# Patient Record
Sex: MALE | URBAN - NONMETROPOLITAN AREA
[De-identification: names, ages, dates, MRNs, and addresses within clinical notes are randomized per-mention and may not be internally consistent; named-entity substitution may affect disease eponyms.]

---

## 2022-06-28 ENCOUNTER — DOCTOR'S OFFICE (OUTPATIENT)
Dept: URBAN - NONMETROPOLITAN AREA CLINIC 1 | Facility: CLINIC | Age: 82
Setting detail: OPHTHALMOLOGY
End: 2022-06-28
Payer: COMMERCIAL

## 2022-06-28 DIAGNOSIS — H35.363: ICD-10-CM

## 2022-06-28 PROCEDURE — 99214 OFFICE O/P EST MOD 30 MIN: CPT | Performed by: OPHTHALMOLOGY

## 2022-06-28 PROCEDURE — 92134 CPTRZ OPH DX IMG PST SGM RTA: CPT | Performed by: OPHTHALMOLOGY

## 2022-06-28 ASSESSMENT — LID POSITION - DERMATOCHALASIS
OD_DERMATOCHALASIS: T
OS_DERMATOCHALASIS: T

## 2022-06-28 ASSESSMENT — LID POSITION - PTOSIS
OD_PTOSIS: T
OS_PTOSIS: T

## 2022-06-28 ASSESSMENT — CONFRONTATIONAL VISUAL FIELD TEST (CVF)
OS_FINDINGS: FULL
OD_FINDINGS: FULL

## 2022-06-30 ASSESSMENT — KERATOMETRY
OS_AXISANGLE_DEGREES: 94
OS_K1POWER_DIOPTERS: 46.25
OD_K1POWER_DIOPTERS: 44.25
OS_K2POWER_DIOPTERS: 47.25
OD_AXISANGLE_DEGREES: 103
OD_K2POWER_DIOPTERS: 44.75

## 2022-06-30 ASSESSMENT — VISUAL ACUITY
OS_BCVA: 20/200
OD_BCVA: 20/200+1

## 2022-08-11 ENCOUNTER — DOCTOR'S OFFICE (OUTPATIENT)
Dept: URBAN - NONMETROPOLITAN AREA CLINIC 1 | Facility: CLINIC | Age: 82
Setting detail: OPHTHALMOLOGY
End: 2022-08-11
Payer: COMMERCIAL

## 2022-08-11 DIAGNOSIS — H25.12: ICD-10-CM

## 2022-08-11 PROCEDURE — 92136 OPHTHALMIC BIOMETRY: CPT | Performed by: OPHTHALMOLOGY

## 2022-08-16 ENCOUNTER — DOCTOR'S OFFICE (OUTPATIENT)
Dept: URBAN - NONMETROPOLITAN AREA CLINIC 1 | Facility: CLINIC | Age: 82
Setting detail: OPHTHALMOLOGY
End: 2022-08-16
Payer: COMMERCIAL

## 2022-08-16 DIAGNOSIS — H25.13: ICD-10-CM

## 2022-08-16 DIAGNOSIS — H25.043: ICD-10-CM

## 2022-08-16 DIAGNOSIS — H25.013: ICD-10-CM

## 2022-08-16 PROCEDURE — 99213 OFFICE O/P EST LOW 20 MIN: CPT | Performed by: OPHTHALMOLOGY

## 2022-08-16 ASSESSMENT — LID POSITION - DERMATOCHALASIS
OS_DERMATOCHALASIS: T
OD_DERMATOCHALASIS: T

## 2022-08-16 ASSESSMENT — KERATOMETRY
OD_K1POWER_DIOPTERS: 44.25
OS_K1POWER_DIOPTERS: 44.50
OD_K2POWER_DIOPTERS: 44.25
OS_AXISANGLE_DEGREES: 086
OS_K2POWER_DIOPTERS: 45.00

## 2022-08-16 ASSESSMENT — LID POSITION - PTOSIS
OS_PTOSIS: T
OD_PTOSIS: T

## 2022-08-16 ASSESSMENT — CONFRONTATIONAL VISUAL FIELD TEST (CVF)
OS_FINDINGS: FULL
OD_FINDINGS: FULL

## 2022-08-16 ASSESSMENT — VISUAL ACUITY
OD_BCVA: 20/150+1
OS_BCVA: 20/200

## 2022-08-22 ENCOUNTER — AMBUL SURGICAL CARE (OUTPATIENT)
Dept: URBAN - NONMETROPOLITAN AREA SURGERY 1 | Facility: SURGERY | Age: 82
Setting detail: OPHTHALMOLOGY
End: 2022-08-22
Payer: COMMERCIAL

## 2022-08-22 DIAGNOSIS — H25.012: ICD-10-CM

## 2022-08-22 DIAGNOSIS — H25.042: ICD-10-CM

## 2022-08-22 DIAGNOSIS — H25.12: ICD-10-CM

## 2022-08-22 PROCEDURE — 66982 XCAPSL CTRC RMVL CPLX WO ECP: CPT | Performed by: OPHTHALMOLOGY

## 2022-08-22 PROCEDURE — G8918 PT W/O PREOP ORDER IV AB PRO: HCPCS | Performed by: CLINIC/CENTER

## 2022-08-22 PROCEDURE — G8918 PT W/O PREOP ORDER IV AB PRO: HCPCS | Performed by: OPHTHALMOLOGY

## 2022-08-22 PROCEDURE — G8907 PT DOC NO EVENTS ON DISCHARG: HCPCS | Performed by: OPHTHALMOLOGY

## 2022-08-22 PROCEDURE — 66982 XCAPSL CTRC RMVL CPLX WO ECP: CPT | Performed by: CLINIC/CENTER

## 2022-08-22 PROCEDURE — G8907 PT DOC NO EVENTS ON DISCHARG: HCPCS | Performed by: CLINIC/CENTER

## 2022-08-23 ENCOUNTER — DOCTOR'S OFFICE (OUTPATIENT)
Dept: URBAN - NONMETROPOLITAN AREA CLINIC 1 | Facility: CLINIC | Age: 82
Setting detail: OPHTHALMOLOGY
End: 2022-08-23
Payer: COMMERCIAL

## 2022-08-23 ENCOUNTER — RX ONLY (RX ONLY)
Age: 82
End: 2022-08-23

## 2022-08-23 DIAGNOSIS — H25.043: ICD-10-CM

## 2022-08-23 DIAGNOSIS — H25.013: ICD-10-CM

## 2022-08-23 DIAGNOSIS — H25.13: ICD-10-CM

## 2022-08-23 DIAGNOSIS — H25.041: ICD-10-CM

## 2022-08-23 DIAGNOSIS — H25.011: ICD-10-CM

## 2022-08-23 DIAGNOSIS — H25.11: ICD-10-CM

## 2022-08-23 PROCEDURE — 99024 POSTOP FOLLOW-UP VISIT: CPT | Performed by: OPHTHALMOLOGY

## 2022-08-23 PROCEDURE — 92136 OPHTHALMIC BIOMETRY: CPT | Performed by: OPHTHALMOLOGY

## 2022-08-23 ASSESSMENT — KERATOMETRY
OD_K1POWER_DIOPTERS: 44.25
OD_AXISANGLE_DEGREES: 36
OD_K2POWER_DIOPTERS: 44.754
OS_K2POWER_DIOPTERS: 46.75
OS_AXISANGLE_DEGREES: 65
OS_K1POWER_DIOPTERS: 45.75

## 2022-08-23 ASSESSMENT — VISUAL ACUITY
OS_BCVA: 20/150+1
OD_BCVA: 20/40

## 2022-08-23 ASSESSMENT — LID POSITION - DERMATOCHALASIS
OS_DERMATOCHALASIS: T
OD_DERMATOCHALASIS: T

## 2022-08-23 ASSESSMENT — LID POSITION - PTOSIS
OD_PTOSIS: T
OS_PTOSIS: T

## 2022-08-30 ENCOUNTER — DOCTOR'S OFFICE (OUTPATIENT)
Dept: URBAN - NONMETROPOLITAN AREA CLINIC 1 | Facility: CLINIC | Age: 82
Setting detail: OPHTHALMOLOGY
End: 2022-08-30
Payer: COMMERCIAL

## 2022-08-30 DIAGNOSIS — H25.011: ICD-10-CM

## 2022-08-30 DIAGNOSIS — H25.11: ICD-10-CM

## 2022-08-30 DIAGNOSIS — H25.041: ICD-10-CM

## 2022-08-30 DIAGNOSIS — Z96.1: ICD-10-CM

## 2022-08-30 PROCEDURE — 99024 POSTOP FOLLOW-UP VISIT: CPT | Performed by: OPHTHALMOLOGY

## 2022-08-30 ASSESSMENT — LID POSITION - DERMATOCHALASIS
OS_DERMATOCHALASIS: T
OD_DERMATOCHALASIS: T

## 2022-08-30 ASSESSMENT — SPHEQUIV_DERIVED: OS_SPHEQUIV: 0.125

## 2022-08-30 ASSESSMENT — VISUAL ACUITY
OD_BCVA: 20/20
OS_BCVA: 20/150

## 2022-08-30 ASSESSMENT — KERATOMETRY
OD_K1POWER_DIOPTERS: 44.00
OD_K2POWER_DIOPTERS: 44.50
OS_AXISANGLE_DEGREES: 68
OS_K1POWER_DIOPTERS: 44.50
OS_K2POWER_DIOPTERS: 44.75
OD_AXISANGLE_DEGREES: 73

## 2022-08-30 ASSESSMENT — REFRACTION_AUTOREFRACTION
OS_CYLINDER: -0.25
OS_SPHERE: +0.25
OS_AXIS: 116

## 2022-08-30 ASSESSMENT — AXIALLENGTH_DERIVED: OS_AL: 23.1389

## 2022-08-30 ASSESSMENT — LID POSITION - PTOSIS
OD_PTOSIS: T
OS_PTOSIS: T

## 2022-09-13 ENCOUNTER — AMBUL SURGICAL CARE (OUTPATIENT)
Dept: URBAN - NONMETROPOLITAN AREA SURGERY 1 | Facility: SURGERY | Age: 82
Setting detail: OPHTHALMOLOGY
End: 2022-09-13
Payer: COMMERCIAL

## 2022-09-13 DIAGNOSIS — H25.011: ICD-10-CM

## 2022-09-13 DIAGNOSIS — H25.041: ICD-10-CM

## 2022-09-13 DIAGNOSIS — H25.11: ICD-10-CM

## 2022-09-13 PROCEDURE — 66982 XCAPSL CTRC RMVL CPLX WO ECP: CPT | Performed by: OPHTHALMOLOGY

## 2022-09-13 PROCEDURE — G8918 PT W/O PREOP ORDER IV AB PRO: HCPCS | Performed by: CLINIC/CENTER

## 2022-09-13 PROCEDURE — G8907 PT DOC NO EVENTS ON DISCHARG: HCPCS | Performed by: OPHTHALMOLOGY

## 2022-09-13 PROCEDURE — G8907 PT DOC NO EVENTS ON DISCHARG: HCPCS | Performed by: CLINIC/CENTER

## 2022-09-13 PROCEDURE — 66982 XCAPSL CTRC RMVL CPLX WO ECP: CPT | Performed by: CLINIC/CENTER

## 2022-09-13 PROCEDURE — G8918 PT W/O PREOP ORDER IV AB PRO: HCPCS | Performed by: OPHTHALMOLOGY

## 2022-09-14 ENCOUNTER — RX ONLY (RX ONLY)
Age: 82
End: 2022-09-14

## 2022-09-14 ENCOUNTER — DOCTOR'S OFFICE (OUTPATIENT)
Dept: URBAN - NONMETROPOLITAN AREA CLINIC 1 | Facility: CLINIC | Age: 82
Setting detail: OPHTHALMOLOGY
End: 2022-09-14
Payer: COMMERCIAL

## 2022-09-14 DIAGNOSIS — Z96.1: ICD-10-CM

## 2022-09-14 PROCEDURE — 99024 POSTOP FOLLOW-UP VISIT: CPT | Performed by: OPHTHALMOLOGY

## 2022-09-14 ASSESSMENT — LID POSITION - PTOSIS
OS_PTOSIS: T
OD_PTOSIS: T

## 2022-09-14 ASSESSMENT — REFRACTION_AUTOREFRACTION
OS_CYLINDER: -0.50
OS_SPHERE: +0.75
OS_AXIS: 001

## 2022-09-14 ASSESSMENT — LID POSITION - DERMATOCHALASIS
OS_DERMATOCHALASIS: T
OD_DERMATOCHALASIS: T

## 2022-09-14 ASSESSMENT — KERATOMETRY
OD_K1POWER_DIOPTERS: 43.75
OS_K2POWER_DIOPTERS: 45.00
OS_K1POWER_DIOPTERS: 44.75
OS_AXISANGLE_DEGREES: 079
OD_K2POWER_DIOPTERS: 45.50
OD_AXISANGLE_DEGREES: 039

## 2022-09-14 ASSESSMENT — SPHEQUIV_DERIVED: OS_SPHEQUIV: 0.5

## 2022-09-14 ASSESSMENT — VISUAL ACUITY
OS_BCVA: 20/200-1
OD_BCVA: 20/25-2

## 2022-09-14 ASSESSMENT — AXIALLENGTH_DERIVED: OS_AL: 22.9122

## 2022-09-27 ENCOUNTER — RX ONLY (RX ONLY)
Age: 82
End: 2022-09-27

## 2022-09-27 ENCOUNTER — DOCTOR'S OFFICE (OUTPATIENT)
Dept: URBAN - NONMETROPOLITAN AREA CLINIC 1 | Facility: CLINIC | Age: 82
Setting detail: OPHTHALMOLOGY
End: 2022-09-27
Payer: COMMERCIAL

## 2022-09-27 DIAGNOSIS — Z96.1: ICD-10-CM

## 2022-09-27 PROCEDURE — 99024 POSTOP FOLLOW-UP VISIT: CPT | Performed by: OPHTHALMOLOGY

## 2022-09-27 ASSESSMENT — VISUAL ACUITY
OS_BCVA: 20/20-1
OD_BCVA: 20/20-2

## 2022-09-27 ASSESSMENT — AXIALLENGTH_DERIVED
OS_AL: 23.1777
OD_AL: 23.0894

## 2022-09-27 ASSESSMENT — KERATOMETRY
OD_K1POWER_DIOPTERS: 44.25
OS_K2POWER_DIOPTERS: 44.50
OD_AXISANGLE_DEGREES: 086
OD_K2POWER_DIOPTERS: 44.75
OS_AXISANGLE_DEGREES: 073
OS_K1POWER_DIOPTERS: 44.00

## 2022-09-27 ASSESSMENT — LID POSITION - DERMATOCHALASIS
OS_DERMATOCHALASIS: T
OD_DERMATOCHALASIS: T

## 2022-09-27 ASSESSMENT — SPHEQUIV_DERIVED
OD_SPHEQUIV: 0.375
OS_SPHEQUIV: 0.375

## 2022-09-27 ASSESSMENT — REFRACTION_AUTOREFRACTION
OD_CYLINDER: -0.75
OS_CYLINDER: -0.75
OD_AXIS: 156
OS_SPHERE: +0.75
OS_AXIS: 168
OD_SPHERE: +0.75

## 2022-09-27 ASSESSMENT — LID POSITION - PTOSIS
OS_PTOSIS: T
OD_PTOSIS: T

## 2022-11-15 ENCOUNTER — DOCTOR'S OFFICE (OUTPATIENT)
Dept: URBAN - NONMETROPOLITAN AREA CLINIC 1 | Facility: CLINIC | Age: 82
Setting detail: OPHTHALMOLOGY
End: 2022-11-15
Payer: COMMERCIAL

## 2022-11-15 DIAGNOSIS — H43.811: ICD-10-CM

## 2022-11-15 DIAGNOSIS — H26.40: ICD-10-CM

## 2022-11-15 DIAGNOSIS — Z96.1: ICD-10-CM

## 2022-11-15 DIAGNOSIS — H35.363: ICD-10-CM

## 2022-11-15 PROCEDURE — 99024 POSTOP FOLLOW-UP VISIT: CPT | Performed by: OPHTHALMOLOGY

## 2022-11-15 PROCEDURE — 92134 CPTRZ OPH DX IMG PST SGM RTA: CPT | Performed by: OPHTHALMOLOGY

## 2022-11-15 ASSESSMENT — KERATOMETRY
OS_K2POWER_DIOPTERS: 45.00
OD_AXISANGLE_DEGREES: 079
OD_K2POWER_DIOPTERS: 44.00
OS_K1POWER_DIOPTERS: 44.50
OD_K1POWER_DIOPTERS: 43.50
OS_AXISANGLE_DEGREES: 058

## 2022-11-15 ASSESSMENT — REFRACTION_AUTOREFRACTION
OD_AXIS: 171
OD_CYLINDER: -1.00
OS_SPHERE: ERROR
OD_SPHERE: +0.75

## 2022-11-15 ASSESSMENT — LID POSITION - PTOSIS
OS_PTOSIS: T
OD_PTOSIS: T

## 2022-11-15 ASSESSMENT — CORNEAL DYSTROPHY
OD_DYSTROPHY: + ARCUS
OS_DYSTROPHY: + ARCUS

## 2022-11-15 ASSESSMENT — VISUAL ACUITY
OS_BCVA: 20/20-1
OD_BCVA: 20/20-1

## 2022-11-15 ASSESSMENT — CONFRONTATIONAL VISUAL FIELD TEST (CVF)
OD_FINDINGS: FULL
OS_FINDINGS: FULL

## 2022-11-15 ASSESSMENT — SPHEQUIV_DERIVED: OD_SPHEQUIV: 0.25

## 2022-11-15 ASSESSMENT — LID POSITION - DERMATOCHALASIS
OD_DERMATOCHALASIS: T
OS_DERMATOCHALASIS: T

## 2022-11-15 ASSESSMENT — AXIALLENGTH_DERIVED: OD_AL: 23.4042

## 2022-11-18 ENCOUNTER — AMBUL SURGICAL CARE (OUTPATIENT)
Dept: URBAN - NONMETROPOLITAN AREA SURGERY 1 | Facility: SURGERY | Age: 82
Setting detail: OPHTHALMOLOGY
End: 2022-11-18
Payer: COMMERCIAL

## 2022-11-18 DIAGNOSIS — H26.492: ICD-10-CM

## 2022-11-18 PROCEDURE — G8918 PT W/O PREOP ORDER IV AB PRO: HCPCS | Performed by: OPHTHALMOLOGY

## 2022-11-18 PROCEDURE — 66821 AFTER CATARACT LASER SURGERY: CPT | Performed by: OPHTHALMOLOGY

## 2022-11-18 PROCEDURE — G8918 PT W/O PREOP ORDER IV AB PRO: HCPCS | Performed by: CLINIC/CENTER

## 2022-11-18 PROCEDURE — G8907 PT DOC NO EVENTS ON DISCHARG: HCPCS | Performed by: CLINIC/CENTER

## 2022-11-18 PROCEDURE — 66821 AFTER CATARACT LASER SURGERY: CPT | Performed by: CLINIC/CENTER

## 2022-11-18 PROCEDURE — G8907 PT DOC NO EVENTS ON DISCHARG: HCPCS | Performed by: OPHTHALMOLOGY

## 2022-11-28 ENCOUNTER — AMBUL SURGICAL CARE (OUTPATIENT)
Dept: URBAN - NONMETROPOLITAN AREA SURGERY 1 | Facility: SURGERY | Age: 82
Setting detail: OPHTHALMOLOGY
End: 2022-11-28
Payer: COMMERCIAL

## 2022-11-28 DIAGNOSIS — H26.491: ICD-10-CM

## 2022-11-28 PROBLEM — H26.40 PCO: Status: ACTIVE | Noted: 2022-11-15

## 2022-11-28 PROCEDURE — 66821 AFTER CATARACT LASER SURGERY: CPT | Performed by: CLINIC/CENTER

## 2022-11-28 PROCEDURE — G8907 PT DOC NO EVENTS ON DISCHARG: HCPCS | Performed by: OPHTHALMOLOGY

## 2022-11-28 PROCEDURE — 66821 AFTER CATARACT LASER SURGERY: CPT | Performed by: OPHTHALMOLOGY

## 2022-11-28 PROCEDURE — G8907 PT DOC NO EVENTS ON DISCHARG: HCPCS | Performed by: CLINIC/CENTER

## 2022-11-28 PROCEDURE — G8918 PT W/O PREOP ORDER IV AB PRO: HCPCS | Performed by: CLINIC/CENTER

## 2022-11-28 PROCEDURE — G8918 PT W/O PREOP ORDER IV AB PRO: HCPCS | Performed by: OPHTHALMOLOGY

## 2024-06-27 RX ORDER — OMEPRAZOLE 40 MG/1
40 CAPSULE, DELAYED RELEASE ORAL DAILY
COMMUNITY

## 2024-06-27 RX ORDER — TAMSULOSIN HYDROCHLORIDE 0.4 MG/1
0.4 CAPSULE ORAL
COMMUNITY
Start: 2023-10-24 | End: 2024-10-23

## 2024-06-27 RX ORDER — CARVEDILOL 25 MG/1
25 TABLET ORAL 2 TIMES DAILY
COMMUNITY

## 2024-06-27 RX ORDER — VALSARTAN 160 MG/1
160 TABLET ORAL DAILY
COMMUNITY
Start: 2024-01-09 | End: 2025-01-08

## 2024-06-27 RX ORDER — ATORVASTATIN CALCIUM 40 MG/1
1 TABLET, FILM COATED ORAL EVERY EVENING
COMMUNITY
Start: 2024-05-06

## 2024-06-27 RX ORDER — LORAZEPAM 0.5 MG/1
0.5 TABLET ORAL EVERY 6 HOURS PRN
COMMUNITY
Start: 2024-06-13 | End: 2024-07-13

## 2024-06-28 ENCOUNTER — OFFICE VISIT (OUTPATIENT)
Dept: CARDIOLOGY CLINIC | Facility: CLINIC | Age: 84
End: 2024-06-28
Payer: MEDICARE

## 2024-06-28 VITALS
WEIGHT: 215 LBS | HEIGHT: 69 IN | DIASTOLIC BLOOD PRESSURE: 80 MMHG | BODY MASS INDEX: 31.84 KG/M2 | OXYGEN SATURATION: 98 % | HEART RATE: 60 BPM | SYSTOLIC BLOOD PRESSURE: 142 MMHG

## 2024-06-28 DIAGNOSIS — I48.0 PAROXYSMAL A-FIB (HCC): Primary | ICD-10-CM

## 2024-06-28 DIAGNOSIS — G47.33 OSA (OBSTRUCTIVE SLEEP APNEA): ICD-10-CM

## 2024-06-28 DIAGNOSIS — I10 PRIMARY HYPERTENSION: ICD-10-CM

## 2024-06-28 PROCEDURE — 99204 OFFICE O/P NEW MOD 45 MIN: CPT | Performed by: INTERNAL MEDICINE

## 2024-06-28 RX ORDER — CEFUROXIME AXETIL 250 MG/1
TABLET ORAL
COMMUNITY
Start: 2024-04-23

## 2024-06-28 RX ORDER — DUTASTERIDE 0.5 MG/1
0.5 CAPSULE, LIQUID FILLED ORAL DAILY
COMMUNITY
Start: 2024-04-23

## 2024-06-28 RX ORDER — WARFARIN SODIUM 5 MG/1
TABLET ORAL
COMMUNITY
Start: 2024-05-04

## 2024-06-28 NOTE — ASSESSMENT & PLAN NOTE
Patient remains in normal sinus rhythm with no bleeding issues with Coumadin.  He is followed by [facility] Coumadin clinic and would prefer to have most of his care with [facility] because of close proximity to his home.  Echocardiogram in June 2023 reported normal left ventricular systolic function with mild aortic stenosis.  He will need reimaging in 2025.  Nuclear stress test in October 2023 showed no evidence of ischemia or infarction.

## 2024-06-28 NOTE — PROGRESS NOTES
[facility] [place]  [address]  [address]  [facility] [address] [phone]      Patient name: Kirit Celaya   YOB: 1940   MR no: [id number]      Diagnosis ICD-10-CM Associated Orders   1. Paroxysmal A-fib (HCC)  I48.0       2. Primary hypertension  I10       3. DOLORES (obstructive sleep apnea)  G47.33         ASSESSMENT AND RECOMMENDATIONS:  1. Paroxysmal A-fib (HCC)  Assessment & Plan:  Patient remains in normal sinus rhythm with no bleeding issues with Coumadin.  He is followed by [facility] clinic and would prefer to have most of his care with [facility] because of close proximity to his home.  Echocardiogram in June 2023 reported normal left ventricular systolic function with mild aortic stenosis.  He will need reimaging in 2025.  Nuclear stress test in October 2023 showed no evidence of ischemia or infarction.  2. Primary hypertension  Assessment & Plan:  His home blood pressure log shows somewhat labile blood pressure numbers.  He currently takes his valsartan at night.  I have advised him to start taking the valsartan in the morning.  I have advised him to take his blood pressure every day in the morning and if it is 130/80 or higher, he can take valsartan 160 mg.  However if his blood pressure is 120/80 or less, he should wait for an hour and recheck it before taking his morning valsartan.  I have advised him not to take any additional carvedilol as his heart rate is 60 beats a minute.  If his blood pressures goes over 160/80, he can take an additional half of valsartan.  3. DOLORES (obstructive sleep apnea)  Assessment & Plan:  Patient follows with sleep medicine and does use home CPAP and is waiting for a new machine.       CHIEF COMPLAINT:  Hypertension, paroxysmal atrial fibrillation    HPI:  83-year-old male with past medical history significant for hypertension, paroxysmal atrial fibrillation, status post permanent pacemaker  implantation and obstructive sleep apnea who presents for his first visit to [facility] cardiology.  Patient was recently seen in the emergency room because of hypertension and was not feeling well.  After discharge he states that his next cardiology appointment was in end of July and he did not want to wait that long.  He has seen [facility] cardiology in the past but was getting frustrated because of the late appointments.  He is fairly active and takes care of all of his home chores and denies any chest pain, dyspnea, palpitations or syncope.  I have reviewed all his previous medical record from [facility] as well as hospitalizations and it appears that in January 2023 he was diagnosed with paroxysmal atrial fibrillation and was placed on oral anticoagulation.      History reviewed. No pertinent surgical history.     Social History     Tobacco Use    Smoking status: Never    Smokeless tobacco: Never   Substance Use Topics    Alcohol use: Yes     Comment: social    Drug use: Never       History reviewed. No pertinent family history.     Allergies   Allergen Reactions    Famotidine Abdominal Pain and GI Intolerance         Current Outpatient Medications:     atorvastatin (LIPITOR) 40 mg tablet, Take 1 tablet by mouth every evening, Disp: , Rfl:     carvedilol (COREG) 25 mg tablet, Take 25 mg by mouth 2 (two) times a day, Disp: , Rfl:     dutasteride (AVODART) 0.5 mg capsule, Take 0.5 mg by mouth daily, Disp: , Rfl:     omeprazole (PriLOSEC) 40 MG capsule, Take 40 mg by mouth daily, Disp: , Rfl:     tamsulosin (FLOMAX) 0.4 mg, Take 0.4 mg by mouth, Disp: , Rfl:     valsartan (DIOVAN) 160 mg tablet, Take 160 mg by mouth daily, Disp: , Rfl:     warfarin (COUMADIN) 5 mg tablet, TAKE AS DIRECTED BY HEALTHCARE PROVIDER, Disp: , Rfl:     cefuroxime (CEFTIN) 250 mg tablet, take 1 tablet by mouth twice daily for 10 days (Patient not taking: Reported on 6/28/2024), Disp: , Rfl:     LORazepam (ATIVAN) 0.5 mg  "tablet, Take 0.5 mg by mouth every 6 (six) hours as needed (Patient not taking: Reported on 6/28/2024), Disp: , Rfl:      Lab Results   Component Value Date    CREATININE 0.81 06/11/2024    CREATININE 0.85 03/21/2024    CREATININE 0.84 01/14/2024    K 4.1 06/11/2024    K 3.7 03/21/2024    K 4.1 01/14/2024    SODIUM 138 06/11/2024    SODIUM 141 03/21/2024    SODIUM 139 01/14/2024    TSH 1.82 03/23/2021   Recent labs from June 2024 showed normal CBC and normal comprehensive metabolic panel.  Lipids from May 2023 showed LDL of 54 and triglycerides 181.    REVIEW OF SYSTEMS   Positive for:  High blood pressure, anxiety/stress  Negative for: All remaining as reviewed below and in HPI.    SYSTEM SYMPTOMS REVIEWED:  General--weight change, fever, night sweats  Respiratory--cough, wheezing, shortness of breath, sputum production  Cardiovascular--chest pain, syncope, dyspnea on exertion, edema, decline in exercise tolerance, claudication   Gastrointestinal--persistent vomiting, diarrhea, abdominal distention, blood in stool   Muscular or skeletal--joint pain or swelling   Neurologic--headaches, syncope, abnormal movement  Hematologic--history of easy bruising and bleeding   Endocrine--thyroid enlargement, heat or cold intolerance, polyuria   Psychiatric--anxiety, depression     Vitals:    06/28/24 0837   BP: 142/80   Pulse: 60   SpO2: 98%   Weight: 97.5 kg (215 lb)   Height: 5' 9\" (1.753 m)       Wt Readings from Last 3 Encounters:   06/28/24 97.5 kg (215 lb)        Body mass index is 31.75 kg/m².     General physical examination:    General appearance: Alert, no acute distress, appears stated age,Mild obesity.  HEENT: Mucous membranes are moist.  No obvious abnormality noted.  Neck: Supple with no lymphadenopathy.  No JVD.  Carotid pulses are intact.  No carotid bruit.  Cardiovascular system: Regular rhythm.  Normal S1 and S2.  No murmurs.  No rubs or gallops. Extremities: No edema. No cyanosis.  Pulmonary: Respirations " "unlabored.  Good air entry bilaterally.  Clear to auscultation bilaterally.  Gastrointestinal: Abdomen is soft and nontender.  Bowel sounds are positive.  Musculoskeletal: Upper Extremities: Normal upper motor strength. Lower Extremity: Normal motor strength. Gait: Normal.   Skin: Skin is warm. No rashes or lesions.  Neurological: Patient is alert and oriented with no gross motor deficits.  Psychiatric: Mood showed anxiety/stress  Behavior is normal.        Thank you for allowing me to be a part of this patient's care. If you have further questions, please feel free to contact me.    Destin Sanches MD, FACC, FASZULEYMA    Portions of the record  have been created with voice recognition software.  Occasional grammatical mistakes or wrong word or \"sound alike\" substitutions may have occurred due to the inherent limitations of voice recognition software. Please reach out to me directly for any clarifications.     "

## 2024-06-28 NOTE — ASSESSMENT & PLAN NOTE
His home blood pressure log shows somewhat labile blood pressure numbers.  He currently takes his valsartan at night.  I have advised him to start taking the valsartan in the morning.  I have advised him to take his blood pressure every day in the morning and if it is 130/80 or higher, he can take valsartan 160 mg.  However if his blood pressure is 120/80 or less, he should wait for an hour and recheck it before taking his morning valsartan.  I have advised him not to take any additional carvedilol as his heart rate is 60 beats a minute.  If his blood pressures goes over 160/80, he can take an additional half of valsartan.

## 2024-08-07 ENCOUNTER — NURSE TRIAGE (OUTPATIENT)
Age: 84
End: 2024-08-07

## 2024-08-07 NOTE — TELEPHONE ENCOUNTER
"Reason for Disposition   Taking BP medications and feels is having side effects (e.g., impotence, cough, dizziness)    Answer Assessment - Initial Assessment Questions  1. BLOOD PRESSURE: \"What is the blood pressure?\" \"Did you take at least two measurements 5 minutes apart?\"      /82 HR 60 today before meds/ retook 156/81, /82 HR 61, check again /81 HR59, /82 HR 61, /89. /89, /94 HR 66, /81  2. ONSET: \"When did you take your blood pressure?\"      Has been taking BP multiple times and concerned about elevated BP  3. HOW: \"How did you obtain the blood pressure?\" (e.g., visiting nurse, automatic home BP monitor)      automatic  4. HISTORY: \"Do you have a history of high blood pressure?\"      yes  5. MEDICATIONS: \"Are you taking any medications for blood pressure?\" \"Have you missed any doses recently?\"  Taking his Coreg but his decided to cut dosage in half and take 12.5 mg bid due to SBP in the 90;s        6. OTHER SYMPTOMS: \"Do you have any symptoms?\" (e.g., headache, chest pain, blurred vision, difficulty breathing, weakness)      Denies chest pain, blurry vision, weakness    Not feeling well, feels off   Also being treated for antibiotic for UTI  Under lot stress going family is visiting  Kirit cut his Coreg in half/taking 12.5 mg bid due to his BP being SBP 90's  He did that on his own.  (About 2 weeks ago)    Advised Maybe come in office for a BP check     Please advise    Protocols used: Blood Pressure - High-ADULT-OH    "

## 2024-08-07 NOTE — TELEPHONE ENCOUNTER
Addended by: MARINA MALIK on: 2/23/2018 02:17 PM     Modules accepted: Annalisa, SmartSet     Please let him know that I do not believe his symptoms are because of the valsartan and his carvedilol as there is no evidence of any significant drop in his blood pressure.  He should talk to his primary care physician about the symptoms, who can keep a more closer eye on him.

## 2024-09-30 ENCOUNTER — OFFICE VISIT (OUTPATIENT)
Dept: CARDIOLOGY CLINIC | Facility: CLINIC | Age: 84
End: 2024-09-30
Payer: MEDICARE

## 2024-09-30 VITALS
WEIGHT: 212 LBS | HEIGHT: 69 IN | BODY MASS INDEX: 31.4 KG/M2 | OXYGEN SATURATION: 100 % | SYSTOLIC BLOOD PRESSURE: 148 MMHG | TEMPERATURE: 97.6 F | HEART RATE: 68 BPM | DIASTOLIC BLOOD PRESSURE: 80 MMHG

## 2024-09-30 DIAGNOSIS — I10 PRIMARY HYPERTENSION: ICD-10-CM

## 2024-09-30 DIAGNOSIS — I48.11 LONGSTANDING PERSISTENT ATRIAL FIBRILLATION (HCC): ICD-10-CM

## 2024-09-30 DIAGNOSIS — Z95.0 PACEMAKER: Primary | ICD-10-CM

## 2024-09-30 PROCEDURE — 99214 OFFICE O/P EST MOD 30 MIN: CPT | Performed by: INTERNAL MEDICINE

## 2024-09-30 PROCEDURE — G2211 COMPLEX E/M VISIT ADD ON: HCPCS | Performed by: INTERNAL MEDICINE

## 2024-09-30 NOTE — ASSESSMENT & PLAN NOTE
His blood pressure is fair on the current medications.  I have advised him to take additional 80 mg of valsartan if his blood pressure is more than 150 mmHg.  He states that sometimes he takes half of the carvedilol if his blood pressure is on the low side.  Generally he takes 25 mg of carvedilol twice daily.

## 2024-09-30 NOTE — ASSESSMENT & PLAN NOTE
Patient remains in persistent atrial fibrillation on Coumadin with no bleeding issues and his INR is followed by [facility] Coumadin clinic.  Lexiscan-MPI in  at [facility] was unremarkable.  Interestingly, his echocardiogram in May 2021 at [facility] reported only mild LVH, however echocardiogram for LVH and in June 2023 reported severe asymmetric septal hypertrophy suggestive of possible HCM??

## 2024-09-30 NOTE — PROGRESS NOTES
[facility] [place]  [address]  [address]  [facility] [address] [phone]    Patient Name: Kirit Celaya  YOB: 1940 ;male  MR No: [id number]        Diagnosis ICD-10-CM Associated Orders   1. Pacemaker  Z95.0       2. Primary hypertension  I10       3. Longstanding persistent atrial fibrillation (HCC)  I48.11            Assessment and recommendations:    1. Pacemaker  Assessment & Plan:  Last PPM check in 6-2024 at [facility]  [employer] remote [facility] pacemaker interrogation from [facility]; Remote monitoring period (3/22/24-6/11/24): Persistent AF and remains on A/C therapy, warfarin; 1 VHR episode detected on 5/6/24 lasting 16secs in duration; Presenting rhythm as of 6/11/24=  w/ capture - device programmed VVIR 60 bpm; Battery voltage adequate with 1.5 yrs remaining until PAUL; Available lead measurements stable and adequate; autocap ON; V-paced 100%; Appropriately functioning device   2. Primary hypertension  Assessment & Plan:  His blood pressure is fair on the current medications.  I have advised him to take additional 80 mg of valsartan if his blood pressure is more than 150 mmHg.  He states that sometimes he takes half of the carvedilol if his blood pressure is on the low side.  Generally he takes 25 mg of carvedilol twice daily.  3. Longstanding persistent atrial fibrillation (HCC)  Assessment & Plan:  Patient remains in persistent atrial fibrillation on Coumadin with no bleeding issues and his INR is followed by [facility] Coumadin clinic.  Lexiscan-MPI in  at [facility] was unremarkable.  Interestingly, his echocardiogram in May 2021 at [facility] reported only mild LVH, however echocardiogram for LVH and in June 2023 reported severe asymmetric septal hypertrophy suggestive of possible HCM??     Since he follows up with [facility] pacemaker clinic as well as Coumadin clinic, I have advised him to try to keep his follow-up with [facility]  outpatient cardiology as well for optimization of care under  network.  We are happy to provide any cardiac advice if he is unable to access [facility] cardiology.      CHIEF COMPLAINT:      Atrial fibrillation, hypertension, permanent pacemaker implantation.    HPI:   83-year-old male with past medical history significant for hypertension, paroxysmal atrial fibrillation, status post permanent pacemaker implantation and obstructive sleep apnea who presents for follow-up visit.  He states that he feels much better and his blood pressure is fairly stable though still labile occasionally.  He denies any chest pain, palpitations or syncope.  He was recently seen in June 2024 for the first time at [facility] cardiology.  Patient gets most of his cardiac care through [facility] and had his pacemaker implanted there as well.  His pacemaker is followed by [facility] pacemaker clinic and he also follows with the Coumadin clinic as well.  Patient was recently seen in the emergency room in June 2020 for because of hypertension and was not feeling well.  After discharge he states that his next cardiology appointment with [facility] was in end of July and he did not want to wait that long.  He has seen [facility] cardiology in the past but was getting frustrated because of the late appointments.  He is fairly active and takes care of all of his home chores and denies any chest pain, dyspnea, palpitations or syncope.  I have reviewed all his previous medical record from [facility] as well as hospitalizations and it appears that in January 2023 he was diagnosed with paroxysmal atrial fibrillation and was placed on oral anticoagulation.    Past medical history: As above.      CURRENT  MEDICATIONS:      Current Outpatient Medications:     atorvastatin (LIPITOR) 40 mg tablet, Take 1 tablet by mouth every evening, Disp: , Rfl:     carvedilol (COREG) 25 mg tablet, Take 25 mg by mouth 2 (two) times a day, Disp: , Rfl:     dutasteride (AVODART) 0.5  mg capsule, Take 0.5 mg by mouth daily, Disp: , Rfl:     omeprazole (PriLOSEC) 40 MG capsule, Take 40 mg by mouth daily, Disp: , Rfl:     tamsulosin (FLOMAX) 0.4 mg, Take 0.4 mg by mouth, Disp: , Rfl:     valsartan (DIOVAN) 160 mg tablet, Take 160 mg by mouth daily, Disp: , Rfl:     warfarin (COUMADIN) 5 mg tablet, TAKE AS DIRECTED BY HEALTHCARE PROVIDER, Disp: , Rfl:     cefuroxime (CEFTIN) 250 mg tablet, take 1 tablet by mouth twice daily for 10 days (Patient not taking: Reported on 6/28/2024), Disp: , Rfl:     LORazepam (ATIVAN) 0.5 mg tablet, Take 0.5 mg by mouth every 6 (six) hours as needed (Patient not taking: Reported on 6/28/2024), Disp: , Rfl:     ALLERGIES  Allergies   Allergen Reactions    Famotidine Abdominal Pain and GI Intolerance             REVIEW OF SYSTEMS   Positive for: Labile blood pressure, arthralgias and myalgias  Negative for: All remaining as reviewed below and in HPI.    SYSTEM SYMPTOMS REVIEWED:  General--weight change, fever, night sweats  Respiratory--cough, wheezing, shortness of breath, sputum production  Cardiovascular--chest pain, syncope, dyspnea on exertion, edema, decline in exercise tolerance, claudication   Gastrointestinal--persistent vomiting, diarrhea, abdominal distention, blood in stool   Muscular or skeletal--joint pain or swelling   Neurologic--headaches, syncope, abnormal movement  Hematologic--history of easy bruising and bleeding   Endocrine--thyroid enlargement, heat or cold intolerance, polyuria   Psychiatric--anxiety, depression     General physical examination:    General appearance: Alert, no acute distress, appears stated age, mild obesity.  HEENT: Mucous membranes are moist.  No obvious abnormality noted.  Neck: Supple with no lymphadenopathy.  No JVD.  Carotid pulses are intact.  No carotid bruit.  Cardiovascular system: Regular rhythm.  Normal S1 and S2.  No murmurs.  No rubs or gallops. Extremities: Mild B/L lower ext edema. No cyanosis.  Pulmonary:  "Respirations unlabored.  Good air entry bilaterally.  Clear to auscultation bilaterally.  Gastrointestinal: Abdomen is soft and nontender.  Bowel sounds are positive.  Musculoskeletal: Upper Extremities: Normal upper motor strength. Lower Extremity: Normal motor strength. Gait: Normal.   Skin: Skin is warm. No rashes or lesions.  Neurological: Patient is alert and oriented with no gross motor deficits.  Psychiatric: Mood is normal.  Behavior is normal.    Vitals:    09/30/24 1026   BP: 148/80   Pulse: 68   Temp: 97.6 °F (36.4 °C)   SpO2: 100%      Body mass index is 31.31 kg/m².  Wt Readings from Last 3 Encounters:   09/30/24 96.2 kg (212 lb)   06/28/24 97.5 kg (215 lb)             Destin Sanches MD, FACC, TIFF    Portions of the record  have been created with voice recognition software.  Occasional grammatical mistakes or wrong word or \"sound alike\" substitutions may have occurred due to the inherent limitations of voice recognition software. Please reach out to me directly for any clarifications.   "

## 2024-10-25 ENCOUNTER — DOCTOR'S OFFICE (OUTPATIENT)
Dept: URBAN - NONMETROPOLITAN AREA CLINIC 1 | Facility: CLINIC | Age: 84
Setting detail: OPHTHALMOLOGY
End: 2024-10-25
Payer: MEDICARE

## 2024-10-25 DIAGNOSIS — H10.813: ICD-10-CM

## 2024-10-25 DIAGNOSIS — H02.89: ICD-10-CM

## 2024-10-25 PROCEDURE — 99213 OFFICE O/P EST LOW 20 MIN: CPT | Performed by: OPHTHALMOLOGY

## 2024-10-25 ASSESSMENT — REFRACTION_AUTOREFRACTION
OS_SPHERE: ERROR
OD_SPHERE: +0.75
OD_AXIS: 171
OD_CYLINDER: -1.00

## 2024-10-25 ASSESSMENT — CORNEAL DYSTROPHY
OS_DYSTROPHY: + ARCUS
OD_DYSTROPHY: + ARCUS

## 2024-10-25 ASSESSMENT — LID POSITION - PTOSIS
OD_PTOSIS: T
OS_PTOSIS: T

## 2024-10-25 ASSESSMENT — KERATOMETRY
OS_K1POWER_DIOPTERS: 44.50
OD_K2POWER_DIOPTERS: 44.00
OD_AXISANGLE_DEGREES: 079
OD_K1POWER_DIOPTERS: 43.50
OS_K2POWER_DIOPTERS: 45.00
OS_AXISANGLE_DEGREES: 058

## 2024-10-25 ASSESSMENT — VISUAL ACUITY
OS_BCVA: 20/20-1
OD_BCVA: 20/20

## 2024-10-25 ASSESSMENT — LID POSITION - DERMATOCHALASIS
OD_DERMATOCHALASIS: T
OS_DERMATOCHALASIS: T

## 2024-10-25 ASSESSMENT — DRY EYES - PHYSICIAN NOTES: OD_GENERALCOMMENTS: TRACE SPK

## 2024-11-05 ENCOUNTER — DOCTOR'S OFFICE (OUTPATIENT)
Dept: URBAN - NONMETROPOLITAN AREA CLINIC 1 | Facility: CLINIC | Age: 84
Setting detail: OPHTHALMOLOGY
End: 2024-11-05
Payer: MEDICARE

## 2024-11-05 DIAGNOSIS — H15.101: ICD-10-CM

## 2024-11-05 DIAGNOSIS — H02.89: ICD-10-CM

## 2024-11-05 PROCEDURE — 99213 OFFICE O/P EST LOW 20 MIN: CPT | Performed by: OPHTHALMOLOGY

## 2024-11-05 ASSESSMENT — LID POSITION - PTOSIS
OD_PTOSIS: T
OS_PTOSIS: T

## 2024-11-05 ASSESSMENT — LID POSITION - DERMATOCHALASIS
OS_DERMATOCHALASIS: T
OD_DERMATOCHALASIS: T

## 2024-11-05 ASSESSMENT — REFRACTION_AUTOREFRACTION
OS_CYLINDER: -0.25
OD_SPHERE: +0.50
OD_AXIS: 165
OS_AXIS: 119
OS_SPHERE: +0.50
OD_CYLINDER: -0.50

## 2024-11-05 ASSESSMENT — KERATOMETRY
OD_K1POWER_DIOPTERS: 44.25
OD_K2POWER_DIOPTERS: 44.75
OD_AXISANGLE_DEGREES: 041
OS_K2POWER_DIOPTERS: 44.75
OS_K1POWER_DIOPTERS: 44.75

## 2024-11-05 ASSESSMENT — CONFRONTATIONAL VISUAL FIELD TEST (CVF)
OD_FINDINGS: FULL
OS_FINDINGS: FULL

## 2024-11-05 ASSESSMENT — CORNEAL DYSTROPHY
OD_DYSTROPHY: + ARCUS
OS_DYSTROPHY: + ARCUS

## 2024-11-05 ASSESSMENT — LID POSITION - COMMENTS
OS_COMMENTS: FLOPPY LID SYNDROME
OD_COMMENTS: FLOPPY LID SYNDROME

## 2024-11-05 ASSESSMENT — VISUAL ACUITY
OS_BCVA: 20/25+2
OD_BCVA: 20/25+2

## 2024-11-14 ENCOUNTER — TELEPHONE (OUTPATIENT)
Dept: UROLOGY | Facility: CLINIC | Age: 84
End: 2024-11-14

## 2024-11-14 NOTE — TELEPHONE ENCOUNTER
Patient stopped in the office stated he was at [facility] ER on 11/11/2024 for blood in Urine and Diagnosed with UTI and placed on antibiotic, then stated the [facility] called him and told him to stop the antibiotic.     Patient asked to make an appointment with us, I offered 12/18/2024, however, after reading his discharge summary from the ER it stated to call his family doctor, doctor Funk. Patient agreed to do so. I offered him the appt again he stated he will call us back     He also stated Luanne is his urologist, but he is away for three months

## 2024-11-19 ENCOUNTER — RX ONLY (RX ONLY)
Age: 84
End: 2024-11-19

## 2024-11-19 ENCOUNTER — DOCTOR'S OFFICE (OUTPATIENT)
Dept: URBAN - NONMETROPOLITAN AREA CLINIC 1 | Facility: CLINIC | Age: 84
Setting detail: OPHTHALMOLOGY
End: 2024-11-19
Payer: MEDICARE

## 2024-11-19 DIAGNOSIS — H15.101: ICD-10-CM

## 2024-11-19 PROBLEM — H26.493 POSTERIOR CAPSULAR OPACIFICATION; BOTH EYES: Status: ACTIVE | Noted: 2024-11-05

## 2024-11-19 PROBLEM — H02.83 DERMATOCHALASIS; RIGHT UPPER LID, LEFT UPPER LID: Status: ACTIVE | Noted: 2024-11-05

## 2024-11-19 PROBLEM — H11.153 PINGUECULA; BOTH EYES: Status: ACTIVE | Noted: 2024-11-05

## 2024-11-19 PROCEDURE — 99213 OFFICE O/P EST LOW 20 MIN: CPT | Performed by: OPHTHALMOLOGY

## 2024-11-19 ASSESSMENT — LID POSITION - PTOSIS
OS_PTOSIS: T
OD_PTOSIS: T

## 2024-11-19 ASSESSMENT — VISUAL ACUITY
OS_BCVA: 20/20-1
OD_BCVA: 20/20

## 2024-11-19 ASSESSMENT — CORNEAL DYSTROPHY
OS_DYSTROPHY: + ARCUS
OD_DYSTROPHY: + ARCUS

## 2024-11-19 ASSESSMENT — CONFRONTATIONAL VISUAL FIELD TEST (CVF)
OS_FINDINGS: FULL
OD_FINDINGS: FULL

## 2024-11-19 ASSESSMENT — KERATOMETRY
OD_K1POWER_DIOPTERS: 7.59
OD_AXISANGLE_DEGREES: 080
OS_K2POWER_DIOPTERS: 7.54
OS_K1POWER_DIOPTERS: 7.60
OS_AXISANGLE_DEGREES: 084
OD_K2POWER_DIOPTERS: 7.42

## 2024-11-19 ASSESSMENT — REFRACTION_AUTOREFRACTION
OS_AXIS: 085
OD_CYLINDER: -1.00
OS_CYLINDER: -0.25
OD_SPHERE: -0.25
OD_AXIS: 175
OS_SPHERE: +0.25

## 2024-11-19 ASSESSMENT — LID POSITION - COMMENTS
OD_COMMENTS: FLOPPY LID SYNDROME
OS_COMMENTS: FLOPPY LID SYNDROME

## 2024-11-19 ASSESSMENT — LID POSITION - DERMATOCHALASIS
OD_DERMATOCHALASIS: T
OS_DERMATOCHALASIS: T

## 2024-12-03 ENCOUNTER — DOCTOR'S OFFICE (OUTPATIENT)
Dept: URBAN - NONMETROPOLITAN AREA CLINIC 1 | Facility: CLINIC | Age: 84
Setting detail: OPHTHALMOLOGY
End: 2024-12-03
Payer: MEDICARE

## 2024-12-03 DIAGNOSIS — H15.101: ICD-10-CM

## 2024-12-03 PROCEDURE — 99212 OFFICE O/P EST SF 10 MIN: CPT | Performed by: OPHTHALMOLOGY

## 2024-12-03 ASSESSMENT — CORNEAL DYSTROPHY
OS_DYSTROPHY: + ARCUS
OD_DYSTROPHY: + ARCUS

## 2024-12-03 ASSESSMENT — CONFRONTATIONAL VISUAL FIELD TEST (CVF)
OS_FINDINGS: FULL
OD_FINDINGS: FULL

## 2024-12-03 ASSESSMENT — REFRACTION_AUTOREFRACTION
OS_SPHERE: +0.25
OD_AXIS: 165
OD_CYLINDER: -0.50
OD_SPHERE: -0.25
OS_CYLINDER: -0.25
OS_AXIS: 084

## 2024-12-03 ASSESSMENT — KERATOMETRY
OS_K2POWER_DIOPTERS: 44.75
OS_AXISANGLE_DEGREES: 090
OD_K1POWER_DIOPTERS: 44.25
OD_K2POWER_DIOPTERS: 44.75
OD_AXISANGLE_DEGREES: 067
OS_K1POWER_DIOPTERS: 44.50

## 2024-12-03 ASSESSMENT — LID POSITION - DERMATOCHALASIS
OS_DERMATOCHALASIS: T
OD_DERMATOCHALASIS: T

## 2024-12-03 ASSESSMENT — LID POSITION - PTOSIS
OS_PTOSIS: T
OD_PTOSIS: T

## 2024-12-03 ASSESSMENT — LID POSITION - COMMENTS
OD_COMMENTS: FLOPPY LID SYNDROME
OS_COMMENTS: FLOPPY LID SYNDROME

## 2024-12-03 ASSESSMENT — VISUAL ACUITY
OD_BCVA: 20/20
OS_BCVA: 20/20

## 2025-01-07 ENCOUNTER — DOCTOR'S OFFICE (OUTPATIENT)
Dept: URBAN - NONMETROPOLITAN AREA CLINIC 1 | Facility: CLINIC | Age: 85
Setting detail: OPHTHALMOLOGY
End: 2025-01-07
Payer: MEDICARE

## 2025-01-07 ENCOUNTER — RX ONLY (RX ONLY)
Age: 85
End: 2025-01-07

## 2025-01-07 DIAGNOSIS — H15.101: ICD-10-CM

## 2025-01-07 PROCEDURE — 99212 OFFICE O/P EST SF 10 MIN: CPT | Performed by: OPHTHALMOLOGY

## 2025-01-07 ASSESSMENT — REFRACTION_AUTOREFRACTION
OD_CYLINDER: -0.50
OS_CYLINDER: 0.00
OS_SPHERE: 0.00
OS_AXIS: 000
OD_SPHERE: 0.00
OD_AXIS: 143

## 2025-01-07 ASSESSMENT — LID POSITION - DERMATOCHALASIS
OS_DERMATOCHALASIS: T
OD_DERMATOCHALASIS: T

## 2025-01-07 ASSESSMENT — LID POSITION - COMMENTS
OS_COMMENTS: FLOPPY LID SYNDROME
OD_COMMENTS: FLOPPY LID SYNDROME

## 2025-01-07 ASSESSMENT — KERATOMETRY
OS_K1POWER_DIOPTERS: 44.75
OS_AXISANGLE_DEGREES: 144
OD_K2POWER_DIOPTERS: 44.75
OD_K1POWER_DIOPTERS: 44.25
OD_AXISANGLE_DEGREES: 053
OS_K2POWER_DIOPTERS: 45.00

## 2025-01-07 ASSESSMENT — CORNEAL DYSTROPHY
OD_DYSTROPHY: + ARCUS
OS_DYSTROPHY: + ARCUS

## 2025-01-07 ASSESSMENT — LID POSITION - PTOSIS
OS_PTOSIS: T
OD_PTOSIS: T

## 2025-01-07 ASSESSMENT — VISUAL ACUITY
OS_BCVA: 20/25
OD_BCVA: 20/20-1

## 2025-01-07 ASSESSMENT — CONFRONTATIONAL VISUAL FIELD TEST (CVF)
OD_FINDINGS: FULL
OS_FINDINGS: FULL

## 2025-01-22 ENCOUNTER — APPOINTMENT (EMERGENCY)
Dept: CT IMAGING | Facility: HOSPITAL | Age: 85
End: 2025-01-22
Payer: MEDICARE

## 2025-01-22 ENCOUNTER — HOSPITAL ENCOUNTER (EMERGENCY)
Facility: HOSPITAL | Age: 85
Discharge: HOME | End: 2025-01-22
Attending: EMERGENCY MEDICINE
Payer: MEDICARE

## 2025-01-22 VITALS
BODY MASS INDEX: 31.45 KG/M2 | HEART RATE: 60 BPM | DIASTOLIC BLOOD PRESSURE: 80 MMHG | OXYGEN SATURATION: 100 % | SYSTOLIC BLOOD PRESSURE: 162 MMHG | WEIGHT: 213 LBS | TEMPERATURE: 97.6 F | RESPIRATION RATE: 18 BRPM

## 2025-01-22 DIAGNOSIS — R42 LIGHTHEADEDNESS: Primary | ICD-10-CM

## 2025-01-22 DIAGNOSIS — R10.9 ABDOMINAL PAIN: ICD-10-CM

## 2025-01-22 LAB
2HR DELTA HS TROPONIN: 0 NG/L
ALBUMIN SERPL BCG-MCNC: 4 G/DL (ref 3.5–5)
ALP SERPL-CCNC: 67 U/L (ref 34–104)
ALT SERPL W P-5'-P-CCNC: 19 U/L (ref 7–52)
ANION GAP SERPL CALCULATED.3IONS-SCNC: 5 MMOL/L (ref 4–13)
APTT PPP: 42 SECONDS (ref 23–34)
AST SERPL W P-5'-P-CCNC: 20 U/L (ref 13–39)
BACTERIA UR QL AUTO: NORMAL /HPF
BASOPHILS # BLD AUTO: 0.02 THOUSANDS/ΜL (ref 0–0.1)
BASOPHILS NFR BLD AUTO: 0 % (ref 0–1)
BILIRUB SERPL-MCNC: 0.89 MG/DL (ref 0.2–1)
BILIRUB UR QL STRIP: NEGATIVE
BUN SERPL-MCNC: 13 MG/DL (ref 5–25)
CALCIUM SERPL-MCNC: 9 MG/DL (ref 8.4–10.2)
CARDIAC TROPONIN I PNL SERPL HS: 8 NG/L (ref ?–50)
CARDIAC TROPONIN I PNL SERPL HS: 8 NG/L (ref ?–50)
CHLORIDE SERPL-SCNC: 105 MMOL/L (ref 96–108)
CLARITY UR: CLEAR
CO2 SERPL-SCNC: 27 MMOL/L (ref 21–32)
COLOR UR: YELLOW
CREAT SERPL-MCNC: 0.86 MG/DL (ref 0.6–1.3)
EOSINOPHIL # BLD AUTO: 0.05 THOUSAND/ΜL (ref 0–0.61)
EOSINOPHIL NFR BLD AUTO: 1 % (ref 0–6)
ERYTHROCYTE [DISTWIDTH] IN BLOOD BY AUTOMATED COUNT: 13.2 % (ref 11.6–15.1)
FLUAV AG UPPER RESP QL IA.RAPID: NEGATIVE
FLUBV AG UPPER RESP QL IA.RAPID: NEGATIVE
GFR SERPL CREATININE-BSD FRML MDRD: 80 ML/MIN/1.73SQ M
GLUCOSE SERPL-MCNC: 119 MG/DL (ref 65–140)
GLUCOSE UR STRIP-MCNC: NEGATIVE MG/DL
HCT VFR BLD AUTO: 44.5 % (ref 36.5–49.3)
HGB BLD-MCNC: 14.6 G/DL (ref 12–17)
HGB UR QL STRIP.AUTO: ABNORMAL
IMM GRANULOCYTES # BLD AUTO: 0.02 THOUSAND/UL (ref 0–0.2)
IMM GRANULOCYTES NFR BLD AUTO: 0 % (ref 0–2)
INR PPP: 3.91 (ref 0.85–1.19)
KETONES UR STRIP-MCNC: NEGATIVE MG/DL
LACTATE SERPL-SCNC: 1 MMOL/L (ref 0.5–2)
LEUKOCYTE ESTERASE UR QL STRIP: NEGATIVE
LYMPHOCYTES # BLD AUTO: 1.51 THOUSANDS/ΜL (ref 0.6–4.47)
LYMPHOCYTES NFR BLD AUTO: 20 % (ref 14–44)
MAGNESIUM SERPL-MCNC: 1.9 MG/DL (ref 1.9–2.7)
MCH RBC QN AUTO: 29.4 PG (ref 26.8–34.3)
MCHC RBC AUTO-ENTMCNC: 32.8 G/DL (ref 31.4–37.4)
MCV RBC AUTO: 90 FL (ref 82–98)
MONOCYTES # BLD AUTO: 0.52 THOUSAND/ΜL (ref 0.17–1.22)
MONOCYTES NFR BLD AUTO: 7 % (ref 4–12)
NEUTROPHILS # BLD AUTO: 5.43 THOUSANDS/ΜL (ref 1.85–7.62)
NEUTS SEG NFR BLD AUTO: 72 % (ref 43–75)
NITRITE UR QL STRIP: NEGATIVE
NON-SQ EPI CELLS URNS QL MICRO: NORMAL /HPF
NRBC BLD AUTO-RTO: 0 /100 WBCS
PH UR STRIP.AUTO: 6.5 [PH]
PLATELET # BLD AUTO: 142 THOUSANDS/UL (ref 149–390)
PMV BLD AUTO: 13.1 FL (ref 8.9–12.7)
POTASSIUM SERPL-SCNC: 4.1 MMOL/L (ref 3.5–5.3)
PROT SERPL-MCNC: 6.7 G/DL (ref 6.4–8.4)
PROT UR STRIP-MCNC: NEGATIVE MG/DL
PROTHROMBIN TIME: 37.9 SECONDS (ref 12.3–15)
RBC # BLD AUTO: 4.96 MILLION/UL (ref 3.88–5.62)
RBC #/AREA URNS AUTO: NORMAL /HPF
SARS-COV+SARS-COV-2 AG RESP QL IA.RAPID: NEGATIVE
SODIUM SERPL-SCNC: 137 MMOL/L (ref 135–147)
SP GR UR STRIP.AUTO: 1.01 (ref 1–1.03)
UROBILINOGEN UR QL STRIP.AUTO: 0.2 E.U./DL
WBC # BLD AUTO: 7.55 THOUSAND/UL (ref 4.31–10.16)
WBC #/AREA URNS AUTO: NORMAL /HPF

## 2025-01-22 PROCEDURE — 96360 HYDRATION IV INFUSION INIT: CPT

## 2025-01-22 PROCEDURE — 81001 URINALYSIS AUTO W/SCOPE: CPT | Performed by: PHYSICIAN ASSISTANT

## 2025-01-22 PROCEDURE — 84484 ASSAY OF TROPONIN QUANT: CPT | Performed by: PHYSICIAN ASSISTANT

## 2025-01-22 PROCEDURE — 74177 CT ABD & PELVIS W/CONTRAST: CPT

## 2025-01-22 PROCEDURE — 85025 COMPLETE CBC W/AUTO DIFF WBC: CPT | Performed by: PHYSICIAN ASSISTANT

## 2025-01-22 PROCEDURE — 85610 PROTHROMBIN TIME: CPT | Performed by: PHYSICIAN ASSISTANT

## 2025-01-22 PROCEDURE — 85730 THROMBOPLASTIN TIME PARTIAL: CPT | Performed by: PHYSICIAN ASSISTANT

## 2025-01-22 PROCEDURE — 93005 ELECTROCARDIOGRAM TRACING: CPT

## 2025-01-22 PROCEDURE — 99284 EMERGENCY DEPT VISIT MOD MDM: CPT | Performed by: PHYSICIAN ASSISTANT

## 2025-01-22 PROCEDURE — 71275 CT ANGIOGRAPHY CHEST: CPT

## 2025-01-22 PROCEDURE — 80053 COMPREHEN METABOLIC PANEL: CPT | Performed by: PHYSICIAN ASSISTANT

## 2025-01-22 PROCEDURE — 36415 COLL VENOUS BLD VENIPUNCTURE: CPT | Performed by: PHYSICIAN ASSISTANT

## 2025-01-22 PROCEDURE — 83605 ASSAY OF LACTIC ACID: CPT | Performed by: PHYSICIAN ASSISTANT

## 2025-01-22 PROCEDURE — 87811 SARS-COV-2 COVID19 W/OPTIC: CPT

## 2025-01-22 PROCEDURE — 87804 INFLUENZA ASSAY W/OPTIC: CPT

## 2025-01-22 PROCEDURE — 70450 CT HEAD/BRAIN W/O DYE: CPT

## 2025-01-22 PROCEDURE — 99284 EMERGENCY DEPT VISIT MOD MDM: CPT

## 2025-01-22 PROCEDURE — 83735 ASSAY OF MAGNESIUM: CPT | Performed by: PHYSICIAN ASSISTANT

## 2025-01-22 RX ORDER — SUCRALFATE 1 G/1
1 TABLET ORAL 4 TIMES DAILY
Qty: 56 TABLET | Refills: 0 | Status: SHIPPED | OUTPATIENT
Start: 2025-01-22 | End: 2025-02-05

## 2025-01-22 RX ORDER — MECLIZINE HYDROCHLORIDE 25 MG/1
25 TABLET ORAL 3 TIMES DAILY PRN
Qty: 30 TABLET | Refills: 0 | Status: SHIPPED | OUTPATIENT
Start: 2025-01-22

## 2025-01-22 RX ORDER — MECLIZINE HYDROCHLORIDE 25 MG/1
25 TABLET ORAL ONCE
Status: COMPLETED | OUTPATIENT
Start: 2025-01-22 | End: 2025-01-22

## 2025-01-22 RX ADMIN — MECLIZINE HYDROCHLORIDE 25 MG: 25 TABLET ORAL at 16:14

## 2025-01-22 RX ADMIN — SODIUM CHLORIDE 500 ML: 0.9 INJECTION, SOLUTION INTRAVENOUS at 15:44

## 2025-01-22 RX ADMIN — IOHEXOL 75 ML: 350 INJECTION, SOLUTION INTRAVENOUS at 15:07

## 2025-01-23 LAB
ATRIAL RATE: 227 BPM
QRS AXIS: -76 DEGREES
QRSD INTERVAL: 172 MS
QT INTERVAL: 492 MS
QTC INTERVAL: 492 MS
T WAVE AXIS: 60 DEGREES
VENTRICULAR RATE: 60 BPM

## 2025-01-23 NOTE — ED PROVIDER NOTES
"Time reflects when diagnosis was documented in both MDM as applicable and the Disposition within this note       Time User Action Codes Description Comment    1/22/2025  4:53 PM Minh Castelan Add [R42] Lightheadedness     1/22/2025  4:53 PM Minh Castelan Add [R10.9] Abdominal pain           ED Disposition       ED Disposition   Discharge    Condition   Stable    Date/Time   Wed Jan 22, 2025  4:53 PM    Comment   Kirit Harrishner discharge to home/self care.                   Assessment & Plan       Medical Decision Making  Patient is an 84-year-old male who presents with the complaint of abdominal pain and feeling off.  The patient states that over the last 2 weeks he has started having worsening abdominal pain.  States that he has a history of acid reflux and Deng's esophagus.  Patient states that he also noticed that he has felt intermittently dizzy for the last 2 weeks that is gradually worsened.  He states that today he felt as though he may fall.  He had to grab to nearby objects.  He denies any spinning sensation.  Patient feels as though he is lightheaded during the episodes.  He denies any ear ringing or ear pain any cough congestion fevers chills.  Denies any new medication changes.        Patient denies any \"abdominal pain \"patient states that he feels the symptoms in his belly that then goes to his head.  Having \"hard time describing this\".  I suspect that the patient is having near syncopal events.  Patient also may be having vertiginous etiology.  Did try Antivert.  Will also try some Carafate.  Workup negative.  Patient ambulated well.  Will continue to follow-up with PCP.  Will trial Antivert and Carafate in agreement treatment.    Amount and/or Complexity of Data Reviewed  Labs: ordered. Decision-making details documented in ED Course.  Radiology: ordered and independent interpretation performed. Decision-making details documented in ED Course.    Risk  Prescription drug management.        ED Course " as of 01/22/25 2227 Wed Jan 22, 2025   1755 POCT INR(!): 3.91       Medications   iohexol (OMNIPAQUE) 350 MG/ML injection (MULTI-DOSE) 75 mL (75 mL Intravenous Given 1/22/25 1507)   sodium chloride 0.9 % bolus 500 mL (0 mL Intravenous Stopped 1/22/25 1659)   meclizine (ANTIVERT) tablet 25 mg (25 mg Oral Given 1/22/25 1614)       ED Risk Strat Scores                          SBIRT 22yo+      Flowsheet Row Most Recent Value   Initial Alcohol Screen: US AUDIT-C     1. How often do you have a drink containing alcohol? 0 Filed at: 01/22/2025 1306   2. How many drinks containing alcohol do you have on a typical day you are drinking?  0 Filed at: 01/22/2025 1306   3b. FEMALE Any Age, or MALE 65+: How often do you have 4 or more drinks on one occassion? 0 Filed at: 01/22/2025 1306   Audit-C Score 0 Filed at: 01/22/2025 1306   CHAVA: How many times in the past year have you...    Used an illegal drug or used a prescription medication for non-medical reasons? Never Filed at: 01/22/2025 1306                            History of Present Illness       Chief Complaint   Patient presents with    Dizziness     Reports he is not feeling well, was seen at [facility] and had dizziness, fatigue, abdominal pain and weakness for over 10 days       Past Medical History:   Diagnosis Date    Deng's esophagus     Longstanding persistent atrial fibrillation (HCC) 06/28/2024      History reviewed. No pertinent surgical history.   History reviewed. No pertinent family history.   Social History     Tobacco Use    Smoking status: Never    Smokeless tobacco: Never   Vaping Use    Vaping status: Never Used   Substance Use Topics    Alcohol use: Yes     Comment: social    Drug use: Never      E-Cigarette/Vaping    E-Cigarette Use Never User       E-Cigarette/Vaping Substances      I have reviewed and agree with the history as documented.     Patient is an 84-year-old male who presents with the complaint of abdominal pain and feeling off.  The  patient states that over the last 2 weeks he has started having worsening abdominal pain.  States that he has a history of acid reflux and Deng's esophagus.  Patient states that he also noticed that he has felt intermittently dizzy for the last 2 weeks that is gradually worsened.  He states that today he felt as though he may fall.  He had to grab to nearby objects.  He denies any spinning sensation.  Patient feels as though he is lightheaded during the episodes.  He denies any ear ringing or ear pain any cough congestion fevers chills.  Denies any new medication changes.          Review of Systems   All other systems reviewed and are negative.          Objective       ED Triage Vitals [01/22/25 1303]   Temperature Pulse Blood Pressure Respirations SpO2 Patient Position - Orthostatic VS   97.6 °F (36.4 °C) 74 152/85 16 100 % Sitting      Temp Source Heart Rate Source BP Location FiO2 (%) Pain Score    Temporal Monitor Left arm -- No Pain      Vitals      Date and Time Temp Pulse SpO2 Resp BP Pain Score FACES Pain Rating User   01/22/25 1630 -- 60 100 % 18 162/80 -- --    01/22/25 1600 -- 60 100 % 18 152/74 -- --    01/22/25 1545 -- 58 100 % 18 153/83 -- --    01/22/25 1540 -- 60 -- -- 149/82 -- --    01/22/25 1539 -- 60 -- -- 163/79 -- --    01/22/25 1536 -- 61 -- -- 156/88 -- --    01/22/25 1500 -- 60 98 % 18 129/76 -- --    01/22/25 1303 97.6 °F (36.4 °C) 74 100 % 16 152/85 No Pain -- BF            Physical Exam  Vitals and nursing note reviewed.   Constitutional:       General: He is not in acute distress.     Appearance: He is well-developed.   HENT:      Head: Normocephalic and atraumatic.      Mouth/Throat:      Mouth: Mucous membranes are dry.   Eyes:      Extraocular Movements: Extraocular movements intact.      Pupils: Pupils are equal, round, and reactive to light.   Cardiovascular:      Rate and Rhythm: Normal rate and regular rhythm.      Heart sounds: No murmur heard.  Pulmonary:       Effort: Pulmonary effort is normal. No respiratory distress.      Breath sounds: Normal breath sounds.   Abdominal:      General: Bowel sounds are normal.      Palpations: Abdomen is soft.      Tenderness: There is no abdominal tenderness.   Musculoskeletal:      Cervical back: Normal range of motion.   Skin:     General: Skin is warm and dry.      Capillary Refill: Capillary refill takes less than 2 seconds.   Neurological:      General: No focal deficit present.      Mental Status: He is alert and oriented to person, place, and time.   Psychiatric:         Behavior: Behavior normal.         Results Reviewed       Procedure Component Value Units Date/Time    HS Troponin I 2hr [[id number]]  (Normal) Collected: 01/22/25 1614    Lab Status: Final result Specimen: Blood from Arm, Right Updated: 01/22/25 1802     hs TnI 2hr 8 ng/L      Delta 2hr hsTnI 0 ng/L     Urine Microscopic [[id number]]  (Normal) Collected: 01/22/25 1538    Lab Status: Final result Specimen: Urine, Clean Catch Updated: 01/22/25 1601     RBC, UA 1-2 /hpf      WBC, UA 0-1 /hpf      Epithelial Cells Occasional /hpf      Bacteria, UA Occasional /hpf     UA w Reflex to Microscopic w Reflex to Culture [[id number]]  (Abnormal) Collected: 01/22/25 1538    Lab Status: Final result Specimen: Urine, Clean Catch Updated: 01/22/25 1547     Color, UA Yellow     Clarity, UA Clear     Specific Gravity, UA 1.010     pH, UA 6.5     Leukocytes, UA Negative     Nitrite, UA Negative     Protein, UA Negative mg/dl      Glucose, UA Negative mg/dl      Ketones, UA Negative mg/dl      Urobilinogen, UA 0.2 E.U./dl      Bilirubin, UA Negative     Occult Blood, UA Small    Protime-INR [[id number]]  (Abnormal) Collected: 01/22/25 1433    Lab Status: Final result Specimen: Blood from Arm, Right Updated: 01/22/25 1514     Protime 37.9 seconds      INR 3.91    Narrative:      INR Therapeutic Range    Indication                                             INR Range      Atrial  Fibrillation                                               2.0-3.0  Hypercoagulable State                                    2.0.2.3  Left Ventricular Asist Device                            2.0-3.0  Mechanical Heart Valve                                  -    Aortic(with afib, MI, embolism, HF, LA enlargement,    and/or coagulopathy)                                     2.0-3.0 (2.5-3.5)     Mitral                                                             2.5-3.5  Prosthetic/Bioprosthetic Heart Valve               2.0-3.0  Venous thromboembolism (VTE: VT, PE        2.0-3.0    APTT [[id number]]  (Abnormal) Collected: 01/22/25 1433    Lab Status: Final result Specimen: Blood from Arm, Right Updated: 01/22/25 1514     PTT 42 seconds     HS Troponin 0hr (reflex protocol) [[id number]]  (Normal) Collected: 01/22/25 1433    Lab Status: Final result Specimen: Blood from Arm, Right Updated: 01/22/25 1505     hs TnI 0hr 8 ng/L     Lactic acid, plasma (w/reflex if result > 2.0) [[id number]]  (Normal) Collected: 01/22/25 1433    Lab Status: Final result Specimen: Blood from Arm, Right Updated: 01/22/25 1459     LACTIC ACID 1.0 mmol/L     Narrative:      Result may be elevated if tourniquet was used during collection.    Comprehensive metabolic panel [[id number]] Collected: 01/22/25 1433    Lab Status: Final result Specimen: Blood from Arm, Right Updated: 01/22/25 1458     Sodium 137 mmol/L      Potassium 4.1 mmol/L      Chloride 105 mmol/L      CO2 27 mmol/L      ANION GAP 5 mmol/L      BUN 13 mg/dL      Creatinine 0.86 mg/dL      Glucose 119 mg/dL      Calcium 9.0 mg/dL      AST 20 U/L      ALT 19 U/L      Alkaline Phosphatase 67 U/L      Total Protein 6.7 g/dL      Albumin 4.0 g/dL      Total Bilirubin 0.89 mg/dL      eGFR 80 ml/min/1.73sq m     Narrative:      National Kid[employer] Disease Foundation guidelines for Chronic Kidney Disease (CKD):     Stage 1 with normal or high GFR (GFR > 90 mL/min/1.73 square meters)    Stage 2 Mild  CKD (GFR = 60-89 mL/min/1.73 square meters)    Stage 3A Moderate CKD (GFR = 45-59 mL/min/1.73 square meters)    Stage 3B Moderate CKD (GFR = 30-44 mL/min/1.73 square meters)    Stage 4 Severe CKD (GFR = 15-29 mL/min/1.73 square meters)    Stage 5 End Stage CKD (GFR <15 mL/min/1.73 square meters)  Note: GFR calculation is accurate only with a steady state creatinine    Magnesium [[id number]]  (Normal) Collected: 01/22/25 1433    Lab Status: Final result Specimen: Blood from Arm, Right Updated: 01/22/25 1458     Magnesium 1.9 mg/dL     CBC and differential [[id number]]  (Abnormal) Collected: 01/22/25 1433    Lab Status: Final result Specimen: Blood from Arm, Right Updated: 01/22/25 1454     WBC 7.55 Thousand/uL      RBC 4.96 Million/uL      Hemoglobin 14.6 g/dL      Hematocrit 44.5 %      MCV 90 fL      MCH 29.4 pg      MCHC 32.8 g/dL      RDW 13.2 %      MPV 13.1 fL      Platelets 142 Thousands/uL      nRBC 0 /100 WBCs      Segmented % 72 %      Immature Grans % 0 %      Lymphocytes % 20 %      Monocytes % 7 %      Eosinophils Relative 1 %      Basophils Relative 0 %      Absolute Neutrophils 5.43 Thousands/µL      Absolute Immature Grans 0.02 Thousand/uL      Absolute Lymphocytes 1.51 Thousands/µL      Absolute Monocytes 0.52 Thousand/µL      Eosinophils Absolute 0.05 Thousand/µL      Basophils Absolute 0.02 Thousands/µL     FLU/COVID Rapid Antigen (30 min. TAT) - Preferred screening test in ED [[id number]]  (Normal) Collected: 01/22/25 1307    Lab Status: Final result Specimen: Nares from Nose Updated: 01/22/25 1345     SARS COV Rapid Antigen Negative     Influenza A Rapid Antigen Negative     Influenza B Rapid Antigen Negative    Narrative:      This test has been performed using the [facility] Rosalva 2 FLU+SARS Antigen test under the Emergency Use Authorization (EUA). This test has been validated by the  and verified by the performing laboratory. The Rosalva uses lateral flow immunofluorescent sandwich assay to  detect SARS-COV, Influenza A and Influenza B Antigen.     The [facility] Rosalva 2 SARS Antigen test does not differentiate between SARS-CoV and SARS-CoV-2.     Negative results are presumptive and may be confirmed with a molecular assay, if necessary, for patient management. Negative results do not rule out SARS-CoV-2 or influenza infection and should not be used as the sole basis for treatment or patient management decisions. A negative test result may occur if the level of antigen in a sample is below the limit of detection of this test.     Positive results are indicative of the presence of viral antigens, but do not rule out bacterial infection or co-infection with other viruses.     All test results should be used as an adjunct to clinical observations and other information available to the provider.    FOR PEDIATRIC PATIENTS - copy/paste COVID Guidelines URL to browser: [email or web address]            CT head without contrast   Final Interpretation by Jared Rea MD (01/22 6551)      No acute intracranial abnormality.  Chronic microangiopathic changes.   Linear calcification in the left lennox is likely dystrophic. This was described on prior outside exams.               Workstation performed: [id number]         CT pe study w abdomen pelvis w contrast   Final Interpretation by Jared Rea MD (01/22 6024)   1. No pulmonary embolism.   2. No acute intra-abdominal process seen.   3. 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   4. Indeterminate 1.3 cm left renal lesion, likely a proteinaceous or hemorrhagic cyst. This has been described on prior outside CTs. Consider follow-up renal ultrasound in 6 months to verify stability.         The study was marked in EPIC for significant notification.            Workstation performed: [id number]             Procedures    ED Medication and Procedure Management   Prior to Admission  Medications   Prescriptions Last Dose Informant Patient Reported? Taking?   LORazepam (ATIVAN) 0.5 mg tablet   Yes No   Sig: Take 0.5 mg by mouth every 6 (six) hours as needed   Patient not taking: Reported on 6/28/2024   atorvastatin (LIPITOR) 40 mg tablet   Yes No   Sig: Take 1 tablet by mouth every evening   carvedilol (COREG) 25 mg tablet   Yes No   Sig: Take 25 mg by mouth 2 (two) times a day   cefuroxime (CEFTIN) 250 mg tablet   Yes No   Sig: take 1 tablet by mouth twice daily for 10 days   Patient not taking: Reported on 6/28/2024   dutasteride (AVODART) 0.5 mg capsule   Yes No   Sig: Take 0.5 mg by mouth daily   omeprazole (PriLOSEC) 40 MG capsule   Yes No   Sig: Take 40 mg by mouth daily   tamsulosin (FLOMAX) 0.4 mg   Yes No   Sig: Take 0.4 mg by mouth   valsartan (DIOVAN) 160 mg tablet   Yes No   Sig: Take 160 mg by mouth daily   warfarin (COUMADIN) 5 mg tablet   Yes No   Sig: TAKE AS DIRECTED BY HEALTHCARE PROVIDER      Facility-Administered Medications: None     Discharge Medication List as of 1/22/2025  6:04 PM        CONTINUE these medications which have NOT CHANGED    Details   atorvastatin (LIPITOR) 40 mg tablet Take 1 tablet by mouth every evening, Starting Mon 5/6/2024, Historical Med      carvedilol (COREG) 25 mg tablet Take 25 mg by mouth 2 (two) times a day, Historical Med      cefuroxime (CEFTIN) 250 mg tablet take 1 tablet by mouth twice daily for 10 days, Historical Med      dutasteride (AVODART) 0.5 mg capsule Take 0.5 mg by mouth daily, Starting Tue 4/23/2024, Historical Med      LORazepam (ATIVAN) 0.5 mg tablet Take 0.5 mg by mouth every 6 (six) hours as needed, Starting Thu 6/13/2024, Until Sat 7/13/2024 at 2359, Historical Med      omeprazole (PriLOSEC) 40 MG capsule Take 40 mg by mouth daily, Historical Med      tamsulosin (FLOMAX) 0.4 mg Take 0.4 mg by mouth, Starting Tue 10/24/2023, Until Wed 10/23/2024 at 2359, Historical Med      valsartan (DIOVAN) 160 mg tablet Take 160 mg by mouth  daily, Starting Tue 1/9/2024, Until Wed 1/8/2025, Historical Med      warfarin (COUMADIN) 5 mg tablet TAKE AS DIRECTED BY HEALTHCARE PROVIDER, Historical Med           No discharge procedures on file.  ED SEPSIS DOCUMENTATION   Time reflects when diagnosis was documented in both MDM as applicable and the Disposition within this note       Time User Action Codes Description Comment    1/22/2025  4:53 PM Minh Castelan [R42] Lightheadedness     1/22/2025  4:53 PM Minh Castelan [R10.9] Abdominal pain                  Minh Castelan PA-C  01/22/25 6307

## 2025-03-10 RX ORDER — OMEPRAZOLE 20 MG/1
20 CAPSULE, DELAYED RELEASE ORAL DAILY
COMMUNITY
Start: 2025-01-21

## 2025-07-15 ENCOUNTER — DOCTOR'S OFFICE (OUTPATIENT)
Dept: URBAN - NONMETROPOLITAN AREA CLINIC 1 | Facility: CLINIC | Age: 85
Setting detail: OPHTHALMOLOGY
End: 2025-07-15
Payer: MEDICARE

## 2025-07-15 DIAGNOSIS — H11.153: ICD-10-CM

## 2025-07-15 DIAGNOSIS — H43.811: ICD-10-CM

## 2025-07-15 DIAGNOSIS — H02.89: ICD-10-CM

## 2025-07-15 DIAGNOSIS — H02.433: ICD-10-CM

## 2025-07-15 DIAGNOSIS — H35.363: ICD-10-CM

## 2025-07-15 DIAGNOSIS — H02.834: ICD-10-CM

## 2025-07-15 DIAGNOSIS — H02.831: ICD-10-CM

## 2025-07-15 PROCEDURE — 99213 OFFICE O/P EST LOW 20 MIN: CPT | Performed by: OPHTHALMOLOGY

## 2025-07-15 PROCEDURE — 92134 CPTRZ OPH DX IMG PST SGM RTA: CPT | Performed by: OPHTHALMOLOGY

## 2025-07-15 ASSESSMENT — KERATOMETRY
OD_K2POWER_DIOPTERS: 44.75
OD_K1POWER_DIOPTERS: 44.00
OD_AXISANGLE_DEGREES: 046
OS_K1POWER_DIOPTERS: 43.25
OS_K2POWER_DIOPTERS: 46.50
OS_AXISANGLE_DEGREES: 020

## 2025-07-15 ASSESSMENT — CORNEAL DYSTROPHY
OD_DYSTROPHY: + ARCUS
OS_DYSTROPHY: + ARCUS

## 2025-07-15 ASSESSMENT — REFRACTION_AUTOREFRACTION
OD_AXIS: 115
OD_SPHERE: +1.00
OS_SPHERE: 0.00
OS_AXIS: 000
OS_CYLINDER: 0.00
OD_CYLINDER: -1.25

## 2025-07-15 ASSESSMENT — DRY EYES - PHYSICIAN NOTES
OD_GENERALCOMMENTS: TRACE PEE
OS_GENERALCOMMENTS: TRACE PEE

## 2025-07-15 ASSESSMENT — LID POSITION - PTOSIS
OS_PTOSIS: T
OD_PTOSIS: T

## 2025-07-15 ASSESSMENT — LID POSITION - COMMENTS
OD_COMMENTS: FLOPPY LID SYNDROME
OS_COMMENTS: FLOPPY LID SYNDROME

## 2025-07-15 ASSESSMENT — LID POSITION - DERMATOCHALASIS
OD_DERMATOCHALASIS: T
OS_DERMATOCHALASIS: T

## 2025-07-15 ASSESSMENT — CONFRONTATIONAL VISUAL FIELD TEST (CVF)
OD_FINDINGS: FULL
OS_FINDINGS: FULL

## 2025-07-15 ASSESSMENT — VISUAL ACUITY
OD_BCVA: 20/30-1
OS_BCVA: 20/30-2

## 2025-07-15 ASSESSMENT — TONOMETRY
OS_IOP_MMHG: 17
OD_IOP_MMHG: 17